# Patient Record
Sex: FEMALE | Race: WHITE | RURAL
[De-identification: names, ages, dates, MRNs, and addresses within clinical notes are randomized per-mention and may not be internally consistent; named-entity substitution may affect disease eponyms.]

---

## 2017-01-02 DIAGNOSIS — Z78.0 POST-MENOPAUSAL: ICD-10-CM

## 2017-02-22 RX ORDER — ALENDRONATE SODIUM 70 MG/1
TABLET ORAL
Qty: 4 TAB | Refills: 3 | Status: SHIPPED | OUTPATIENT
Start: 2017-02-22 | End: 2017-04-27

## 2017-04-27 ENCOUNTER — OFFICE VISIT (OUTPATIENT)
Dept: FAMILY MEDICINE CLINIC | Age: 77
End: 2017-04-27

## 2017-04-27 VITALS
WEIGHT: 134 LBS | SYSTOLIC BLOOD PRESSURE: 118 MMHG | OXYGEN SATURATION: 95 % | HEIGHT: 61 IN | TEMPERATURE: 97.9 F | BODY MASS INDEX: 25.3 KG/M2 | HEART RATE: 67 BPM | DIASTOLIC BLOOD PRESSURE: 60 MMHG | RESPIRATION RATE: 16 BRPM

## 2017-04-27 DIAGNOSIS — E78.00 PURE HYPERCHOLESTEROLEMIA: ICD-10-CM

## 2017-04-27 DIAGNOSIS — Z00.00 MEDICARE ANNUAL WELLNESS VISIT, SUBSEQUENT: Primary | ICD-10-CM

## 2017-04-27 DIAGNOSIS — M81.0 AGE-RELATED OSTEOPOROSIS WITHOUT CURRENT PATHOLOGICAL FRACTURE: ICD-10-CM

## 2017-04-27 NOTE — MR AVS SNAPSHOT
Visit Information Date & Time Provider Department Dept. Phone Encounter #  
 4/27/2017  8:00 AM Yakelin Portillo  [address] [phone] [id number] Upcoming Health Maintenance Date Due  
 GLAUCOMA SCREENING Q2Y 8/25/2005 MEDICARE YEARLY EXAM 4/28/2017 DTaP/Tdap/Td series (2 - Td) 4/27/2027 Allergies as of 4/27/2017  Review Complete On: 4/27/2017 By: Yakelin Portillo MD  
 No Known Allergies Current Immunizations  Reviewed on 4/27/2017 Name Date Influenza High Dose Vaccine PF 10/27/2016 Influenza Vaccine 10/24/2014, 11/6/2013 Influenza Vaccine Evins Sluder) 10/27/2015 Pneumococcal Conjugate (PCV-13) 10/27/2015  8:22 AM  
 Pneumococcal Polysaccharide (PPSV-23) 10/27/2016 Reviewed by Lily Scott LPN on 8/94/0367 at  8:12 AM  
You Were Diagnosed With   
  
 Codes Comments Medicare annual wellness visit, subsequent    -  Primary ICD-10-CM: Z00.00 ICD-9-CM: V70.0 Pure hypercholesterolemia     ICD-10-CM: E78.00 ICD-9-CM: 272.0 Age-related osteoporosis without current pathological fracture     ICD-10-CM: M81.0 ICD-9-CM: 733.01 Vitals BP Pulse Temp Resp Height(growth percentile) Weight(growth percentile)  
 118/60 (BP 1 Location: Left arm, BP Patient Position: Sitting) 67 97.9 °F (36.6 °C) (Oral) 16 5' 1\" (1.549 m) 134 lb (60.8 kg) SpO2 BMI OB Status Smoking Status 95% 25.32 kg/m2 Postmenopausal Former Smoker BMI and BSA Data Body Mass Index Body Surface Area  
 25.32 kg/m 2 1.62 m 2 [facility]erred Pharmacy Pharm[facility] Name Phone [address] [phone] Your Updated Medication List  
  
   
This list is accurate as of: 4/27/17  8:30 AM.  Always use your most recent med list.  
  
  
  
  
 calcium 500 mg Tab Take 1,000 mg by mouth. FIBER-CAPS PO Take  by mouth. Two tabs daily  
  
 garlic 3,378 mg Cap Take  by mouth daily. multivitamin tablet Commonly known as:  ONE A DAY Take 1 Tab by mouth daily. VITAMIN D3 1,000 unit tablet Generic drug:  cholecalciferol Take 2,000 Units by mouth daily. Introducing [facility] & HEALTH SERVICES! Dear Trevon Durant: 
Thank you for requesting a [facility]ST[facility] account. Our records indicate that you already have an active HSTYLE account. You can access your account anytime at [email or web address] Did you know that you can access your hospital and ER discharge instructions at any time in HSTYLE? You can also review all of your test results from your hospital stay or ER visit. Additional Information If you have questions, please visit the Frequently Asked Questions section of the HSTYLE website at [email or web address] Remember, HSTYLE is NOT to be used for urgent needs. For medical emergencies, dial 911. Now available from your iPhone and Android! Please provide this summary of care documentation to your next provider. If you have any questions after today's visit, please call [phone].

## 2017-04-27 NOTE — ACP (ADVANCE CARE PLANNING)
Currently does not have an AD or LW. She is a  and lives alone. In the event that she cannot speak for herself, she requests that her daughter, Femi Khan, do so for her and she can be reached at .     Orpah Severs

## 2017-04-27 NOTE — PROGRESS NOTES
Chief Complaint   Patient presents with    Cholesterol Problem     follow-up visit         HPI:      Ms Carole Kirk is a very pleasant 69 yo retired  from [place]. She is a ;   from [address]. Son lives in [place] and daughter lives in [place]. Attends the 0800 exercise class at the [facility] M[facility]F. Eats a healthy diet and maintains a healthy weight. Actively being treated with Bisphosphonate therapy for osteoporosis. There is no history of fracture. Treated well over 5 yrs with Bisphosphonates. Previously treated with Simvastatin, she has not taken this for several months. Has high HDL (95) and no other risk factors for vascular disease. Previously noted to be Vitamin D deficient and remains on replacement therapy. Colonoscopy at age 79 was normal.    New Issues:  She is due for SAWV    No Known Allergies    Current Outpatient Prescriptions   Medication Sig    alendronate (FOSAMAX) 70 mg tablet TAKE ONE TABLET BY MOUTH ONCE A WEEK    garlic 2,924 mg cap Take  by mouth daily.  PSYLLIUM HUSK (FIBER-CAPS PO) Take  by mouth. Two tabs daily    calcium 500 mg Tab Take 1,000 mg by mouth.  multivitamin (ONE A DAY) tablet Take 1 Tab by mouth daily.  cholecalciferol (VITAMIN D3) 1,000 unit tablet Take 2,000 Units by mouth daily. No current facility-administered medications for this visit. Past Medical History:   Diagnosis Date    Osteoarthrosis, unspecified whether generalized or localized, lower leg     Osteoporosis, unspecified     Other and unspecified hyperlipidemia     Vitamin D deficiency          ROS:  Denies fever, chills, cough, chest pain, SOB,  nausea, vomiting, or diarrhea. Denies wt loss, wt gain, hemoptysis, hematochezia or melena.     Physical Examination:    /60 (BP 1 Location: Left arm, BP Patient Position: Sitting)  Pulse 67  Temp 97.9 °F (36.6 °C) (Oral)   Resp 16  Ht 5' 1\" (1.549 m)  Wt 134 lb (60.8 kg)  SpO2 95% BMI 25.32 kg/m2    General: Alert and Ox3, Fluent speech  HEENT:  NC/AT, EOMI, OP: clear  Neck:  Supple, no adenopathy, JVD, mass or bruit  Chest:  Clear to Ausculation, without wheezes, rales, rubs or ronchi  Cardiac: RRR  Abdomen:  +BS, soft, nontender without palpable HSM  Extremities:  No cyanosis, clubbing or edema  Neurologic:  Ambulatory without assist, CN 2-12 grossly intact. Moves all extremities. Skin: no rash  Lymphadenopathy: no cervical or supraclavicular nodes      ASSESSMENT AND PLAN:     1. Hyperlipidemia:  Labs today. 2.  Bisphosphonate therapy:  Suspend further therapy at this time. Continue exercise and consider anti resorptive therapy in the future. Continue Vitamin D.  3.  Excellent BP  4. SAWV today    No orders of the defined types were placed in this encounter. Alexsander Franklin MD, FACP        ______________________________________________________________________    Tyler Lugo is a 68 y.o. female and presents for annual Medicare Wellness Visit. Problem List: Reviewed with patient and discussed risk factors. Patient Active Problem List   Diagnosis Code    Hyperlipemia E78.5    Osteoarthrosis involving lower leg M17.10    Osteoporosis M81.0    Vitamin D deficiency E55.9       Current medical providers:  Patient Care Team:  Zia Daigle MD (Family Practice)    [facility], [facility], Medications/Allergies: reviewed, on chart. Female Alcohol Screening: On any occasion during the past 3 months, have you had more than 3 drinks containing alcohol? No    Do you average more than 7 drinks per week?   No    ROS:  Constitutional: No fever, chills or weight loss  Respiratory: No cough, SOB   CV: No chest pain or Palpitations    Objective:  Visit Vitals    /60 (BP 1 Location: Left arm, BP Patient Position: Sitting)    Pulse 67    Temp 97.9 °F (36.6 °C) (Oral)    Resp 16    Ht 5' 1\" (1.549 m)    Wt 134 lb (60.8 kg)    SpO2 95%    BMI 25.32 kg/m2    Body mass index is 25.32 kg/(m^2). Assessment of cognitive impairment: Alert and oriented x 3    Depression Screen:   PHQ 2 / 9, over the last two weeks 4/27/2017   Little interest or pleasure in doing things Not at all   Feeling down, depressed or hopeless Not at all   Total Score PHQ 2 0       Fall Risk Assessment:    Fall Risk Assessment, last 12 mths 4/27/2017   Able to walk? Yes   Fall in past 12 months? No       Functional Ability:   Does the patient exhibit a steady gait? yes   How long did it take the patient to get up and walk from a sitting position? 12 sec   Is the patient self reliant?  (ie can do own laundry, meals, household chores)  yes     Does the patient handle his/her own medications? yes     Does the patient handle his/her own money? yes     Is the patients home safe (ie good lighting, handrails on stairs and bath, etc.)? yes     Did you notice or did patient express any hearing difficulties? yes     Did you notice or did patient express any vision difficulties? no       Advance Care Planning:   Patient was offered the opportunity to discuss advance care planning:  yes     Does patient have an Advance Directive: no   If no, did you provide information on Caring Connections?  no       Plan:      No orders of the defined types were placed in this encounter. Health Maintenance   Topic Date Due    GLAUCOMA SCREENING Q2Y  08/25/2005    MEDICARE YEARLY EXAM  04/28/2017    DTaP/Tdap/Td series (2 - Td) 04/27/2027    OSTEOPOROSIS SCREENING (DEXA)  Completed    ZOSTER VACCINE AGE 60>  Completed    Pneumococcal 65+ Low/Medium Risk  Completed    INFLUENZA AGE 9 TO ADULT  Completed       *Patient verbalized understanding and agreement with the plan. A copy of the After Visit Summary with personalized health plan was given to the patient today.

## 2017-04-28 LAB
ALBUMIN SERPL-MCNC: 3.9 G/DL (ref 3.5–4.8)
ALBUMIN/GLOB SERPL: 1.6 {RATIO} (ref 1.2–2.2)
ALP SERPL-CCNC: 42 IU/L (ref 39–117)
ALT SERPL-CCNC: 14 IU/L (ref 0–32)
AST SERPL-CCNC: 20 IU/L (ref 0–40)
BASOPHILS # BLD AUTO: 0.1 X10E3/UL (ref 0–0.2)
BASOPHILS NFR BLD AUTO: 1 %
BILIRUB SERPL-MCNC: 0.4 MG/DL (ref 0–1.2)
BUN SERPL-MCNC: 14 MG/DL (ref 8–27)
BUN/CREAT SERPL: 18 (ref 12–28)
CALCIUM SERPL-MCNC: 9.4 MG/DL (ref 8.7–10.3)
CHLORIDE SERPL-SCNC: 104 MMOL/L (ref 96–106)
CHOLEST SERPL-MCNC: 284 MG/DL (ref 100–199)
CO2 SERPL-SCNC: 26 MMOL/L (ref 18–29)
CREAT SERPL-MCNC: 0.79 MG/DL (ref 0.57–1)
EOSINOPHIL # BLD AUTO: 0.2 X10E3/UL (ref 0–0.4)
EOSINOPHIL NFR BLD AUTO: 4 %
ERYTHROCYTE [DISTWIDTH] IN BLOOD BY AUTOMATED COUNT: 13.2 % (ref 12.3–15.4)
GLOBULIN SER CALC-MCNC: 2.5 G/DL (ref 1.5–4.5)
GLUCOSE SERPL-MCNC: 85 MG/DL (ref 65–99)
HCT VFR BLD AUTO: 42.7 % (ref 34–46.6)
HDLC SERPL-MCNC: 84 MG/DL
HGB BLD-MCNC: 14.2 G/DL (ref 11.1–15.9)
IMM GRANULOCYTES # BLD: 0 X10E3/UL (ref 0–0.1)
IMM GRANULOCYTES NFR BLD: 0 %
LDLC SERPL CALC-MCNC: 178 MG/DL (ref 0–99)
LYMPHOCYTES # BLD AUTO: 1.6 X10E3/UL (ref 0.7–3.1)
LYMPHOCYTES NFR BLD AUTO: 34 %
MCH RBC QN AUTO: 31.2 PG (ref 26.6–33)
MCHC RBC AUTO-ENTMCNC: 33.3 G/DL (ref 31.5–35.7)
MCV RBC AUTO: 94 FL (ref 79–97)
MONOCYTES # BLD AUTO: 0.3 X10E3/UL (ref 0.1–0.9)
MONOCYTES NFR BLD AUTO: 6 %
NEUTROPHILS # BLD AUTO: 2.5 X10E3/UL (ref 1.4–7)
NEUTROPHILS NFR BLD AUTO: 55 %
PLATELET # BLD AUTO: 320 X10E3/UL (ref 150–379)
POTASSIUM SERPL-SCNC: 5 MMOL/L (ref 3.5–5.2)
PROT SERPL-MCNC: 6.4 G/DL (ref 6–8.5)
RBC # BLD AUTO: 4.55 X10E6/UL (ref 3.77–5.28)
SODIUM SERPL-SCNC: 143 MMOL/L (ref 134–144)
TRIGL SERPL-MCNC: 112 MG/DL (ref 0–149)
TSH SERPL DL<=0.005 MIU/L-ACNC: 2.55 UIU/ML (ref 0.45–4.5)
VLDLC SERPL CALC-MCNC: 22 MG/DL (ref 5–40)
WBC # BLD AUTO: 4.6 X10E3/UL (ref 3.4–10.8)

## 2017-06-14 ENCOUNTER — TELEPHONE (OUTPATIENT)
Dept: FAMILY MEDICINE CLINIC | Age: 77
End: 2017-06-14

## 2017-06-14 NOTE — TELEPHONE ENCOUNTER
----- Message from Adrián Arreaga sent at 6/14/2017  9:40 AM EDT -----  Regarding: NICKOLAS Aldridge/Telephone  Pt called about some pain under her breast that she wanted to get checked out also she breaks out in sweats and would like a call back. Pt best contact number [phone].

## 2017-11-06 ENCOUNTER — OFFICE VISIT (OUTPATIENT)
Dept: FAMILY MEDICINE CLINIC | Age: 77
End: 2017-11-06

## 2017-11-06 VITALS
RESPIRATION RATE: 18 BRPM | HEIGHT: 61 IN | DIASTOLIC BLOOD PRESSURE: 82 MMHG | WEIGHT: 130 LBS | SYSTOLIC BLOOD PRESSURE: 138 MMHG | TEMPERATURE: 97.3 F | BODY MASS INDEX: 24.55 KG/M2 | OXYGEN SATURATION: 97 % | HEART RATE: 72 BPM

## 2017-11-06 DIAGNOSIS — R07.89 XYPHOIDALGIA: ICD-10-CM

## 2017-11-06 DIAGNOSIS — Z23 ENCOUNTER FOR IMMUNIZATION: ICD-10-CM

## 2017-11-06 DIAGNOSIS — E55.9 VITAMIN D DEFICIENCY: ICD-10-CM

## 2017-11-06 DIAGNOSIS — E78.00 PURE HYPERCHOLESTEROLEMIA: Primary | ICD-10-CM

## 2017-11-06 NOTE — MR AVS SNAPSHOT
Visit Information Date & Time Provider Department Dept. Phone Encounter #  
 11/6/2017  1:00 PM Bebeto Phelan MD [address] [place] [address] [phone] [id number] Follow-up Instructions Return in about 6 months (around 5/6/2018). Upcoming Health Maintenance Date Due  
 GLAUCOMA SCREENING Q2Y 8/25/2005 INFLUENZA AGE 9 TO ADULT 8/1/2017 MEDICARE YEARLY EXAM 4/28/2018 DTaP/Tdap/Td series (2 - Td) 4/27/2027 Allergies as of 11/6/2017  Review Complete On: 4/27/2017 By: Brittanie Chairez MD  
 No Known Allergies Current Immunizations  Reviewed on 4/27/2017 Name Date Influenza High Dose Vaccine PF  Incomplete, 10/27/2016 Influenza Vaccine 10/24/2014, 11/6/2013 Influenza Vaccine Sia Miu) 10/27/2015 Pneumococcal Conjugate (PCV-13) 10/27/2015  8:22 AM  
 Pneumococcal Polysaccharide (PPSV-23) 10/27/2016 Not reviewed this visit You Were Diagnosed With   
  
 Codes Comments Pure hypercholesterolemia    -  Primary ICD-10-CM: E78.00 ICD-9-CM: 272.0 Vitamin D deficiency     ICD-10-CM: E55.9 ICD-9-CM: 268.9 [id number]     ICD-10-CM: Q67.34 ICD-9-CM: 733.90 Encounter for immunization     ICD-10-CM: X08 ICD-9-CM: V03.89 Vitals BP Pulse Temp Resp Height(growth percentile) 138/82 (BP 1 Location: Left arm, BP Patient Position: Sitting) 72 97.3 °F (36.3 °C) (Temporal) 18 5' 1\" (1.549 m) Weight(growth percentile) SpO2 BMI OB Status Smoking Status 130 lb (59 kg) 97% 24.56 kg/m2 Postmenopausal Former Smoker BMI and BSA Data Body Mass Index Body Surface Area 24.56 kg/m 2 1.59 m 2 Preferred Pharmacy Pharmacy Name Phone [address], [facility] [place] Lora M[facility] [place] [phone] Your Updated Medication List  
  
   
This list is accurate as of: 11/6/17  1:58 PM.  Always use your most recent med list.  
  
  
  
  
 calcium 500 mg Tab Take 1,000 mg by mouth. FIBER-CAPS PO Take  by mouth. Two tabs daily  
  
 garlic 2,579 mg Cap Take  by mouth daily. multivitamin tablet Commonly known as:  ONE A DAY Take 1 Tab by mouth daily. VITAMIN D3 1,000 unit tablet Generic drug:  cholecalciferol Take 2,000 Units by mouth daily. We Performed the Following INFLUENZA VIRUS VACCINE, HIGH DOSE SEASONAL, PRESERVATIVE FREE [24322 CPT(R)] LIPID PANEL WITH LDL/HDL RATIO [30106 CPT(R)] METABOLIC PANEL, COMPREHENSIVE [55443 CPT(R)] VITAMIN D, 25 HYDROXY [id number] CPT(R)] Follow-up Instructions Return in about 6 months (around 5/6/2018). Introducing [facility] & HEALTH SERVICES! Dear Jones Boyer: 
Thank you for requesting a [facility] account. Our records indicate that you already have an active [facility] account. You can access your account anytime at [email or web address] Did you know that you can access your hospital and ER discharge instructions at any time in [facility]? You can also review all of your test results from your hospital stay or ER visit. Additional Information If you have questions, please visit the Frequently Asked Questions section of the [facility] website at [email or web address] Remember, [facility] is NOT to be used for urgent needs. For medical emergencies, dial 911. Now available from your iPhone and Android! Please provide this summary of care documentation to your next provider. If you have any questions after today's visit, please call [phone].

## 2017-11-06 NOTE — PROGRESS NOTES
HISTORY OF PRESENT ILLNESS  Mary Thompson is a 68 y.o. female. Chief Complaint   Patient presents with    Cholesterol Problem     checkup    Vitamin D Deficiency     chest between breast    Mass       HPI  Hyperlipidemia  Not fasting  Not on meds  Watching diet    Vit D def  On Vit D and Ca  Taking 2000U a day  Off Fosamax now    Tender spot at bottom of sternum  For a while, lately always  Has not tried anything    Review of Systems   Gastrointestinal: Negative for heartburn. Past Medical History:   Diagnosis Date    Osteoarthrosis, unspecified whether generalized or localized, lower leg     Osteoporosis, unspecified     Other and unspecified hyperlipidemia     Vitamin D deficiency      Current Outpatient Prescriptions   Medication Sig Dispense Refill    garlic 1,525 mg cap Take  by mouth daily.  PSYLLIUM HUSK (FIBER-CAPS PO) Take  by mouth. Two tabs daily      calcium 500 mg Tab Take 1,000 mg by mouth.  multivitamin (ONE A DAY) tablet Take 1 Tab by mouth daily.  cholecalciferol (VITAMIN D3) 1,000 unit tablet Take 2,000 Units by mouth daily. No Known Allergies    Physical Exam   Constitutional: She is oriented to person, place, and time. She appears well-developed and well-nourished. No distress. HENT:   Head: Normocephalic and atraumatic. Eyes: Conjunctivae and EOM are normal.   Cardiovascular: Normal rate and regular rhythm. Pulmonary/Chest: Effort normal and breath sounds normal.   Musculoskeletal: She exhibits no edema. Tender Xiphoid   Neurological: She is alert and oriented to person, place, and time. Skin: Skin is warm and dry. Nursing note and vitals reviewed. ASSESSMENT and PLAN    ICD-10-CM ICD-9-CM    1. Pure hypercholesterolemia E78.00 272.0 LIPID PANEL WITH LDL/HDL RATIO      METABOLIC PANEL, COMPREHENSIVE   2. Vitamin D deficiency E55.9 268.9 VITAMIN D, 25 HYDROXY   3. Xyphoidalgia R07.89 733.90 Advised to try Advil prn   4.  Encounter for immunization Z23 V03.89 INFLUENZA VIRUS VACCINE, HIGH DOSE SEASONAL, PRESERVATIVE FREE

## 2017-11-07 LAB
25(OH)D3+25(OH)D2 SERPL-MCNC: 52.7 NG/ML (ref 30–100)
ALBUMIN SERPL-MCNC: 4.5 G/DL (ref 3.5–4.8)
ALBUMIN/GLOB SERPL: 1.9 {RATIO} (ref 1.2–2.2)
ALP SERPL-CCNC: 61 IU/L (ref 39–117)
ALT SERPL-CCNC: 24 IU/L (ref 0–32)
AST SERPL-CCNC: 28 IU/L (ref 0–40)
BILIRUB SERPL-MCNC: 0.3 MG/DL (ref 0–1.2)
BUN SERPL-MCNC: 10 MG/DL (ref 8–27)
BUN/CREAT SERPL: 13 (ref 12–28)
CALCIUM SERPL-MCNC: 10.1 MG/DL (ref 8.7–10.3)
CHLORIDE SERPL-SCNC: 99 MMOL/L (ref 96–106)
CHOLEST SERPL-MCNC: 270 MG/DL (ref 100–199)
CO2 SERPL-SCNC: 29 MMOL/L (ref 18–29)
CREAT SERPL-MCNC: 0.78 MG/DL (ref 0.57–1)
GFR SERPLBLD CREATININE-BSD FMLA CKD-EPI: 74 ML/MIN/1.73
GFR SERPLBLD CREATININE-BSD FMLA CKD-EPI: 85 ML/MIN/1.73
GLOBULIN SER CALC-MCNC: 2.4 G/DL (ref 1.5–4.5)
GLUCOSE SERPL-MCNC: 70 MG/DL (ref 65–99)
HDLC SERPL-MCNC: 80 MG/DL
LDLC SERPL CALC-MCNC: 172 MG/DL (ref 0–99)
LDLC/HDLC SERPL: 2.2 RATIO UNITS (ref 0–3.2)
POTASSIUM SERPL-SCNC: 4.5 MMOL/L (ref 3.5–5.2)
PROT SERPL-MCNC: 6.9 G/DL (ref 6–8.5)
SODIUM SERPL-SCNC: 143 MMOL/L (ref 134–144)
SPECIMEN STATUS REPORT, ROLRST: NORMAL
TRIGL SERPL-MCNC: 88 MG/DL (ref 0–149)
VLDLC SERPL CALC-MCNC: 18 MG/DL (ref 5–40)

## 2017-11-07 NOTE — PROGRESS NOTES
Call pt, the Chol is high, but the good Chol is high, still avoid animal fats  The sugar, kidney and liver tests are normal  The Vit D is normal  Recheck in 6-12 mo